# Patient Record
Sex: FEMALE | Race: ASIAN | NOT HISPANIC OR LATINO | Employment: OTHER | URBAN - METROPOLITAN AREA
[De-identification: names, ages, dates, MRNs, and addresses within clinical notes are randomized per-mention and may not be internally consistent; named-entity substitution may affect disease eponyms.]

---

## 2018-05-02 ENCOUNTER — OFFICE VISIT (OUTPATIENT)
Dept: OBGYN CLINIC | Facility: CLINIC | Age: 43
End: 2018-05-02
Payer: COMMERCIAL

## 2018-05-02 VITALS
HEIGHT: 59 IN | BODY MASS INDEX: 21.97 KG/M2 | SYSTOLIC BLOOD PRESSURE: 109 MMHG | HEART RATE: 72 BPM | DIASTOLIC BLOOD PRESSURE: 69 MMHG | WEIGHT: 109 LBS

## 2018-05-02 DIAGNOSIS — N63.10 BREAST MASS, RIGHT: ICD-10-CM

## 2018-05-02 DIAGNOSIS — N92.1 MENORRHAGIA WITH IRREGULAR CYCLE: ICD-10-CM

## 2018-05-02 DIAGNOSIS — Z12.31 ENCOUNTER FOR SCREENING MAMMOGRAM FOR MALIGNANT NEOPLASM OF BREAST: ICD-10-CM

## 2018-05-02 DIAGNOSIS — Z01.419 ENCOUNTER FOR ANNUAL ROUTINE GYNECOLOGICAL EXAMINATION: Primary | ICD-10-CM

## 2018-05-02 PROCEDURE — 87624 HPV HI-RISK TYP POOLED RSLT: CPT | Performed by: NURSE PRACTITIONER

## 2018-05-02 PROCEDURE — S0610 ANNUAL GYNECOLOGICAL EXAMINA: HCPCS | Performed by: NURSE PRACTITIONER

## 2018-05-02 PROCEDURE — G0145 SCR C/V CYTO,THINLAYER,RESCR: HCPCS | Performed by: NURSE PRACTITIONER

## 2018-05-02 NOTE — PROGRESS NOTES
Assessment   1  Encounter for annual routine gynecological examination  Liquid-based pap, screening   2  Breast mass, right  Mammo diagnostic bilateral w cad   3  Encounter for screening mammogram for malignant neoplasm of breast  Mammo diagnostic bilateral w cad   4  Menorrhagia with irregular cycle                 Plan      All questions answered  Breast self exam technique reviewed and patient encouraged to perform self-exam monthly  Contraception: tubal ligation  Mammogram   Thin prep Pap smear  Schedule endometrial biopsy in 2 weeks  Call with needs or concerns  Pt verbalized understanding of all discussed  Subjective      Olivia Chung is a 40 y o  female who presents for annual exam  Periods are irregular every 14-30  days, lasting 7-10 days  Dysmenorrhea:mild, occurring premenstrually  Cyclic symptoms include bloating and pelvic pain  No intermenstrual bleeding, spotting, or discharge  The patient reports that there is not domestic violence in her life  1 partner x 14 years  Last GYN care > 10 years  Discussed irregular bleeding pattern and need for endometrial Bx, procedure explained    Current contraception: tubal ligation  History of abnormal Pap smear: no  Family history of uterine or ovarian cancer: no  Regular self breast exam: yes  History of abnormal mammogram: Never had one  Family history of breast cancer: no  History of abnormal lipids: unknown  Menstrual History:  OB History      Para Term  AB Living    3 3 3     3    SAB TAB Ectopic Multiple Live Births            1         Menarche age: 16  Patient's last menstrual period was 04/15/2018 (approximate)    Period Duration (Days): 1week - 10days  Period Pattern: (!) Irregular (sometimes twice monthly in the last year)  Menstrual Flow: Light, Heavy  Menstrual Control: Maxi pad, Tampon    The following portions of the patient's history were reviewed and updated as appropriate: allergies, current medications, past family history, past medical history, past social history, past surgical history and problem list     Review of Systems  Pertinent items are noted in HPI        Objective      /69   Pulse 72   Ht 4' 10 6" (1 488 m)   Wt 49 4 kg (109 lb)   LMP 04/15/2018 (Approximate)   BMI 22 32 kg/m²     General:   alert and oriented, in no acute distress, alert, appears stated age and cooperative   Heart: regular rate and rhythm, S1, S2 normal, no murmur, click, rub or gallop   Lungs: clear to auscultation bilaterally   Thyroid negative masses   Abdomen: soft, non-tender, without masses or organomegaly   Vulva: normal   Vagina: normal mucosa   Cervix: no bleeding following Pap, no cervical motion tenderness and no lesions   Uterus: normal size, normal shape and consistency   Adnexa: normal adnexa   Breast R 1 cm x 1 cm moveable mass at 9 o'clock, negative discharge, dimpling, NT  L negative discharge, dimpling r masses, NT

## 2018-05-07 LAB
HPV RRNA GENITAL QL NAA+PROBE: NORMAL
LAB AP GYN PRIMARY INTERPRETATION: NORMAL
Lab: NORMAL

## 2018-05-16 ENCOUNTER — OFFICE VISIT (OUTPATIENT)
Dept: OBGYN CLINIC | Facility: CLINIC | Age: 43
End: 2018-05-16
Payer: COMMERCIAL

## 2018-05-16 VITALS
HEART RATE: 79 BPM | BODY MASS INDEX: 22.32 KG/M2 | DIASTOLIC BLOOD PRESSURE: 65 MMHG | SYSTOLIC BLOOD PRESSURE: 101 MMHG | WEIGHT: 109 LBS

## 2018-05-16 DIAGNOSIS — N93.9 ABNORMAL UTERINE BLEEDING (AUB): Primary | ICD-10-CM

## 2018-05-16 LAB — SL AMB POCT URINE HCG: NORMAL

## 2018-05-16 PROCEDURE — 88305 TISSUE EXAM BY PATHOLOGIST: CPT | Performed by: PATHOLOGY

## 2018-05-16 PROCEDURE — 81025 URINE PREGNANCY TEST: CPT | Performed by: OBSTETRICS & GYNECOLOGY

## 2018-05-16 PROCEDURE — 58100 BIOPSY OF UTERUS LINING: CPT | Performed by: OBSTETRICS & GYNECOLOGY

## 2018-05-16 NOTE — PROGRESS NOTES
Endometrial biopsy  Date/Time: 5/16/2018 1:36 PM  Performed by: John Roper  Authorized by: John Roper     Consent:     Consent obtained:  Written    Consent given by:  Patient    Procedural risks discussed:  Bleeding, damage to other organs, failure rate, repeat procedure and possible continued pain    Patient questions answered: yes      Patient agrees, verbalizes understanding, and wants to proceed: yes    Indication:     Indications:  Other disorder of menstruation and other abnormal bleeding from female genital tract    Pre-procedure:     Pre-procedure timeout performed: yes    Procedure:     Procedure: endometrial biopsy with Pipelle      A bivalve speculum was placed in the vagina: yes      Cervix cleaned and prepped: yes      The cervix was dilated: no      Uterus sounded: yes      Uterus sound depth (cm):  8    Specimen collected: specimen collected and sent to pathology      Patient tolerated procedure well with no complications: yes    Findings:     Uterus size:  6-8 weeks    Cervix: normal    Comments:      Uncomplicated, sufficient tissue retrieved

## 2018-05-30 ENCOUNTER — OFFICE VISIT (OUTPATIENT)
Dept: OBGYN CLINIC | Facility: CLINIC | Age: 43
End: 2018-05-30
Payer: COMMERCIAL

## 2018-05-30 VITALS
SYSTOLIC BLOOD PRESSURE: 103 MMHG | DIASTOLIC BLOOD PRESSURE: 68 MMHG | WEIGHT: 109 LBS | HEART RATE: 67 BPM | BODY MASS INDEX: 22.32 KG/M2

## 2018-05-30 DIAGNOSIS — N84.0 ENDOMETRIAL POLYP: Primary | ICD-10-CM

## 2018-05-30 PROCEDURE — 99214 OFFICE O/P EST MOD 30 MIN: CPT | Performed by: OBSTETRICS & GYNECOLOGY

## 2018-05-30 NOTE — PROGRESS NOTES
44year old  here for results of her EMB secondary to abnormal uterine bleeding  Patient reports having irregular bleeding occurring twice per month over the past year  Her results showed evidence of an endometrial polyp, with no evidence of hyperplasia or carcinoma  Findings were discussed with patient  The details of endometrial polyps were explained to the patient  Patient was recommended for hysteroscopy D&C for removal of the polyp  The details of the procedure were explained to the patient, the risks and benefits  Patient verbalized an understanding of the procedure, all questions were asked  Patient signed the consent form with me in the office as documentation of our discussion about her surgery  Pre-op History and Physical  Patient is a 44 y o  female with AUB and evidence of an endometrial polyp for Hysteroscopy D&C  Indications for procedure are abnormal uterine bleeding with evidence of endometrial polyp  Most recent pap was 2018 and was negative, HPV was also negative      Medical history: patient denies    Surgical history:  section x 3, tubal ligation (at time of last )    Social: patient denies tobacco use    Medications: patient denies      Pertinent Gynecological History:    Menses: irregular occurring approximately every 14-28 days without intermenstrual spotting  Bleeding: dysfunctional uterine bleeding  Contraception: tubal ligation  Sexually transmitted diseases: no past history  Last mammogram: ordered  Last pap: normal Date: 2018      Menstrual History:  OB History      Para Term  AB Living    3 3 3     3    SAB TAB Ectopic Multiple Live Births            3           Patient's last menstrual period was 2018 (exact date)  Physical Exam   Constitutional: She is oriented to person, place, and time  She appears well-developed  No distress  Genitourinary: Vagina normal  No vaginal discharge found     HENT:   Head: Normocephalic and atraumatic  Neck: Normal range of motion  No thyromegaly present  Cardiovascular: Normal rate, regular rhythm and normal heart sounds  Pulmonary/Chest: Effort normal and breath sounds normal    Abdominal: Soft  She exhibits no distension and no mass  There is no tenderness  Musculoskeletal: Normal range of motion  Neurological: She is alert and oriented to person, place, and time  Skin: She is not diaphoretic  Psychiatric: She has a normal mood and affect

## 2018-05-31 PROBLEM — N84.1 ENDOCERVICAL POLYP: Status: ACTIVE | Noted: 2018-05-31

## 2018-05-31 PROBLEM — N93.9 ABNORMAL UTERINE BLEEDING: Status: ACTIVE | Noted: 2018-05-31

## 2018-07-05 ENCOUNTER — OFFICE VISIT (OUTPATIENT)
Dept: OBGYN CLINIC | Facility: CLINIC | Age: 43
End: 2018-07-05
Payer: COMMERCIAL

## 2018-07-05 VITALS
HEIGHT: 59 IN | SYSTOLIC BLOOD PRESSURE: 99 MMHG | HEART RATE: 76 BPM | WEIGHT: 109.6 LBS | DIASTOLIC BLOOD PRESSURE: 65 MMHG | BODY MASS INDEX: 22.09 KG/M2

## 2018-07-05 DIAGNOSIS — Z01.818 PREOPERATIVE EXAM FOR GYNECOLOGIC SURGERY: Primary | ICD-10-CM

## 2018-07-05 PROCEDURE — 99214 OFFICE O/P EST MOD 30 MIN: CPT | Performed by: STUDENT IN AN ORGANIZED HEALTH CARE EDUCATION/TRAINING PROGRAM

## 2018-07-05 NOTE — PROGRESS NOTES
Laurie Vazquez is a 44 y o   female here for H&P for hysteroscopy, D&C  She currently has no bleeding at this time and has no complaints  She states that originally she has irregular bleeding twice a month, but after the endometrial biopsy, she has not had any further bleeding  Patient also states that she never had a TVUS and asked if this surgery was required, because she has to pay completely out of pocket  Gynecologic History  Patient's last menstrual period was 2018  Contraception: tubal ligation  Last Pap: 2018  Results were: normal    Obstetric History  OB History    Para Term  AB Living   3 3 3     3   SAB TAB Ectopic Multiple Live Births           3      # Outcome Date GA Lbr Fco/2nd Weight Sex Delivery Anes PTL Lv   3 Term            2 Term            1 Term                     The following portions of the patient's history were reviewed and updated as appropriate: allergies, current medications, past family history, past medical history, past social history, past surgical history and problem list     Review of Systems  Pertinent items are noted in HPI  Objective    BP 99/65   Pulse 76   Ht 4' 11" (1 499 m)   Wt 49 7 kg (109 lb 9 6 oz)   LMP 2018   BMI 22 14 kg/m²         TVUS showed uterus that was 10 20cm x 6 38cm x 7 66cm  Endometrial lining was difficult to visualize  Left ovary was 2 66cm x 4 87cm x 2 51cm  Right ovary was 2 29cm  x 4 18cm x 2 59cm  No polyps were seen on ultrasound  Assessment    43 yo  who is originally here for H&P for hysteroscopy, D&C who is now asymptomatic and would like to postpone surgery for now  Plan  1  Hysteroscopy, D&C: TVUS was performed and no polyps were noted on ultrasound  Because patient is asymptomatic and increased financial cost,will wait if patient continues to be symptomatic  If she bleeds again, will consider a formal TVUS and then possible hysteroscopy, D&C  D/w Dr Pooja Brock

## 2018-11-19 ENCOUNTER — TELEPHONE (OUTPATIENT)
Dept: OBGYN CLINIC | Facility: CLINIC | Age: 43
End: 2018-11-19

## 2019-02-12 ENCOUNTER — TELEPHONE (OUTPATIENT)
Dept: OBGYN CLINIC | Facility: CLINIC | Age: 44
End: 2019-02-12

## 2019-02-12 NOTE — TELEPHONE ENCOUNTER
Called patient about mammogram script from 2018, per patient she had her mammogram done at [facility]  Order completed